# Patient Record
Sex: MALE
[De-identification: names, ages, dates, MRNs, and addresses within clinical notes are randomized per-mention and may not be internally consistent; named-entity substitution may affect disease eponyms.]

---

## 2019-01-10 ENCOUNTER — HOSPITAL ENCOUNTER (EMERGENCY)
Dept: HOSPITAL 14 - H.ER | Age: 24
Discharge: HOME | End: 2019-01-10
Payer: SELF-PAY

## 2019-01-10 VITALS — DIASTOLIC BLOOD PRESSURE: 87 MMHG | HEART RATE: 120 BPM | SYSTOLIC BLOOD PRESSURE: 139 MMHG

## 2019-01-10 VITALS — RESPIRATION RATE: 20 BRPM | OXYGEN SATURATION: 100 %

## 2019-01-10 VITALS — TEMPERATURE: 98.4 F

## 2019-01-10 DIAGNOSIS — R00.0: ICD-10-CM

## 2019-01-10 DIAGNOSIS — F10.188: Primary | ICD-10-CM

## 2019-01-10 LAB
ALBUMIN SERPL-MCNC: 5.3 G/DL (ref 3.5–5)
ALBUMIN/GLOB SERPL: 1.4 {RATIO} (ref 1–2.1)
ALT SERPL-CCNC: 42 U/L (ref 21–72)
AST SERPL-CCNC: 33 U/L (ref 17–59)
BASOPHILS # BLD AUTO: 0 K/UL (ref 0–0.2)
BASOPHILS NFR BLD: 0.7 % (ref 0–2)
BUN SERPL-MCNC: 14 MG/DL (ref 9–20)
CALCIUM SERPL-MCNC: 9.3 MG/DL (ref 8.4–10.2)
EOSINOPHIL # BLD AUTO: 0 K/UL (ref 0–0.7)
EOSINOPHIL NFR BLD: 0.5 % (ref 0–4)
ERYTHROCYTE [DISTWIDTH] IN BLOOD BY AUTOMATED COUNT: 14 % (ref 11.5–14.5)
GFR NON-AFRICAN AMERICAN: > 60
HGB BLD-MCNC: 15.7 G/DL (ref 12–18)
LYMPHOCYTES # BLD AUTO: 1.5 K/UL (ref 1–4.3)
LYMPHOCYTES NFR BLD AUTO: 24.1 % (ref 20–40)
MCH RBC QN AUTO: 29.6 PG (ref 27–31)
MCHC RBC AUTO-ENTMCNC: 33.9 G/DL (ref 33–37)
MCV RBC AUTO: 87.4 FL (ref 80–94)
MONOCYTES # BLD: 0.5 K/UL (ref 0–0.8)
MONOCYTES NFR BLD: 8 % (ref 0–10)
NEUTROPHILS # BLD: 4.1 K/UL (ref 1.8–7)
NEUTROPHILS NFR BLD AUTO: 66.7 % (ref 50–75)
NRBC BLD AUTO-RTO: 0.1 % (ref 0–0)
PLATELET # BLD: 223 K/UL (ref 130–400)
PMV BLD AUTO: 8.4 FL (ref 7.2–11.7)
RBC # BLD AUTO: 5.3 MIL/UL (ref 4.4–5.9)
WBC # BLD AUTO: 6.1 K/UL (ref 4.8–10.8)

## 2019-01-10 PROCEDURE — 70486 CT MAXILLOFACIAL W/O DYE: CPT

## 2019-01-10 PROCEDURE — 93005 ELECTROCARDIOGRAM TRACING: CPT

## 2019-01-10 PROCEDURE — 85025 COMPLETE CBC W/AUTO DIFF WBC: CPT

## 2019-01-10 PROCEDURE — 80053 COMPREHEN METABOLIC PANEL: CPT

## 2019-01-10 PROCEDURE — 83735 ASSAY OF MAGNESIUM: CPT

## 2019-01-10 PROCEDURE — 84443 ASSAY THYROID STIM HORMONE: CPT

## 2019-01-10 PROCEDURE — 99283 EMERGENCY DEPT VISIT LOW MDM: CPT

## 2019-01-10 PROCEDURE — 84100 ASSAY OF PHOSPHORUS: CPT

## 2019-01-10 PROCEDURE — 96360 HYDRATION IV INFUSION INIT: CPT

## 2019-01-10 PROCEDURE — 70450 CT HEAD/BRAIN W/O DYE: CPT

## 2019-01-10 PROCEDURE — 82948 REAGENT STRIP/BLOOD GLUCOSE: CPT

## 2019-01-10 PROCEDURE — 84484 ASSAY OF TROPONIN QUANT: CPT
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
## 2019-01-10 NOTE — CT
Date of service: 



01/10/2019



PROCEDURE:  CT MAXILLOFACIAL BONES WITHOUT CONTRAST



HISTORY:

facial trauma



COMPARISON:

None available.



TECHNIQUE:

Contiguous axial CT  images of the maxillofacial bones were obtained. 

Coronal and sagittal reformats were generated.



Radiation dose:



Total exam DLP = 751.04 mGy-cm.



This CT exam was performed using one or more of the following dose 

reduction techniques: Automated exposure control, adjustment of the 

mA and/or kV according to patient size, and/or use of iterative 

reconstruction technique.



FINDINGS:



NASAL BONES:

Unremarkable.



ORBITS:

Unremarkable.



PARANASAL SINUSES/ MASTOIDS:

Well developed and aerated diffusely.



MAXILLA:

Unremarkable.



MANDIBLE/ TEMPOROMANDIBULAR JOINTS:

Unremarkable.



SKULL BASE:

Unremarkable.



TEMPORAL BONES:

Middle ears and mastoid grossly unremarkable.



OTHER FINDINGS:

None.



IMPRESSION:

CT of the facial bones fail demonstrate fracture or destructive bony 

lesion throughout.

## 2019-01-10 NOTE — CARD
--------------- APPROVED REPORT --------------





Date of service: 01/10/2019



EKG Measurement

Heart [id number]

[id number]

[id number]

[id number]

[id number]



<Conclusion>

Sinus tachycardia

Otherwise normal ECG

## 2019-01-10 NOTE — CT
Date of service: 



01/10/2019



PROCEDURE:  CT HEAD WITHOUT CONTRAST.



HISTORY:

r/o ICH



COMPARISON:

None available.



TECHNIQUE:

Axial computed tomography images were obtained through the head/brain 

without intravenous contrast.  



Radiation dose:



Total exam DLP = 888.46 mGy-cm.



This CT exam was performed using one or more of the following dose 

reduction techniques: Automated exposure control, adjustment of the 

mA and/or kV according to patient size, and/or use of iterative 

reconstruction technique.



FINDINGS:



HEMORRHAGE:

No intracranial hemorrhage. 



BRAIN:

Normal gray-white matter differentiation and density are appreciated 

throughout the cerebrum and cerebellum with the brainstem appearing 

unremarkable as well.  There is no mass effect.  There is no 

suspicious extra-axial fluid collection and the midline brain anatomy 

appears diffusely unremarkable. 



VENTRICLES:

Unremarkable. No hydrocephalus. 



CALVARIUM:

No destructive bony lesion or displaced fracture identified including 

through the skullbase.



PARANASAL SINUSES:

Unremarkable as visualized. No significant inflammatory changes.



MASTOID AIR CELLS:

Unremarkable as visualized. No inflammatory changes.



OTHER FINDINGS:

None.



IMPRESSION:

Unremarkable noncontrast CT of the Head.